# Patient Record
Sex: FEMALE
[De-identification: names, ages, dates, MRNs, and addresses within clinical notes are randomized per-mention and may not be internally consistent; named-entity substitution may affect disease eponyms.]

---

## 2020-05-10 ENCOUNTER — NURSE TRIAGE (OUTPATIENT)
Dept: OTHER | Facility: CLINIC | Age: 35
End: 2020-05-10

## 2020-05-18 ENCOUNTER — NURSE TRIAGE (OUTPATIENT)
Dept: OTHER | Facility: CLINIC | Age: 35
End: 2020-05-18

## 2020-05-18 NOTE — TELEPHONE ENCOUNTER
(4-7): interferes with normal activities or awakens from sleep, tender to touch     - SEVERE (8-10): excruciating pain, doubled over, unable to do any normal activities        no  7. ORAL INTAKE: If vomiting, \"Have you been able to drink liquids? \" \"How much fluids have you had in the past 24 hours? \"      n/a  8. HYDRATION: \"Any signs of dehydration? \" (e.g., dry mouth [not just dry lips], too weak to stand, dizziness, new weight loss) \"When did you last urinate? \"      Weak lips dry urinating normal  9. EXPOSURE: \"Have you traveled to a foreign country recently? \" \"Have you been exposed to anyone with diarrhea? \" \"Could you have eaten any food that was spoiled? \"      no  10. ANTIBIOTIC USE: \"Are you taking antibiotics now or have you taken antibiotics in the past 2 months? \"        Yes augmentin nystatin  11. OTHER SYMPTOMS: \"Do you have any other symptoms? \" (e.g., fever, blood in stool)        mucus  12. PREGNANCY: \"Is there any chance you are pregnant? \" \"When was your last menstrual period? \"        no    Protocols used: STOOLS - UNUSUAL COLOR-ADULT-AH, DIARRHEA-ADULT-AH  [place] of [place] right [place] does not have primary care gave 24/7 jania

## 2022-08-22 ENCOUNTER — LAB REQUISITION (OUTPATIENT)
Dept: LAB | Facility: CLINIC | Age: 37
End: 2022-08-22
Payer: COMMERCIAL

## 2022-08-22 DIAGNOSIS — Z11.59 ENCOUNTER FOR SCREENING FOR OTHER VIRAL DISEASES: ICD-10-CM

## 2022-08-22 DIAGNOSIS — Z11.4 ENCOUNTER FOR SCREENING FOR HUMAN IMMUNODEFICIENCY VIRUS (HIV): ICD-10-CM

## 2022-08-22 DIAGNOSIS — A53.9 SYPHILIS, UNSPECIFIED: ICD-10-CM

## 2022-08-22 DIAGNOSIS — Z91.89 OTHER SPECIFIED PERSONAL RISK FACTORS, NOT ELSEWHERE CLASSIFIED: ICD-10-CM

## 2022-08-22 PROCEDURE — 87591 N.GONORRHOEAE DNA AMP PROB: CPT | Mod: ORL | Performed by: OBSTETRICS & GYNECOLOGY

## 2022-08-22 PROCEDURE — 87491 CHLMYD TRACH DNA AMP PROBE: CPT | Mod: ORL | Performed by: OBSTETRICS & GYNECOLOGY

## 2022-08-22 PROCEDURE — 86803 HEPATITIS C AB TEST: CPT | Mod: ORL | Performed by: OBSTETRICS & GYNECOLOGY

## 2022-08-22 PROCEDURE — 87340 HEPATITIS B SURFACE AG IA: CPT | Mod: ORL | Performed by: OBSTETRICS & GYNECOLOGY

## 2022-08-22 PROCEDURE — 86592 SYPHILIS TEST NON-TREP QUAL: CPT | Mod: ORL | Performed by: OBSTETRICS & GYNECOLOGY

## 2022-08-22 PROCEDURE — 87389 HIV-1 AG W/HIV-1&-2 AB AG IA: CPT | Mod: ORL | Performed by: OBSTETRICS & GYNECOLOGY

## 2022-08-23 LAB
C TRACH DNA SPEC QL PROBE+SIG AMP: NEGATIVE
HCV AB SERPL QL IA: NONREACTIVE
N GONORRHOEA DNA SPEC QL NAA+PROBE: NEGATIVE
RPR SER QL: NONREACTIVE

## 2022-08-24 LAB
HBV SURFACE AG SERPL QL IA: NONREACTIVE
HIV 1+2 AB+HIV1 P24 AG SERPL QL IA: NONREACTIVE